# Patient Record
(demographics unavailable — no encounter records)

---

## 2025-02-06 NOTE — SOCIAL HISTORY
[House] : [unfilled] lives in a house  [Humidifier] : uses a humidifier [Bedroom] :  in bedroom [None] : none [Smokers in Household] : there are no smokers in the home [de-identified] : are rug in living room

## 2025-02-06 NOTE — END OF VISIT
[FreeTextEntry3] : BEAR Gunn has acted like a scribe on my behalf. I have reviewed the note and edited where appropriate. History, PE, assessment, and plan were personally performed by me.

## 2025-02-06 NOTE — SOCIAL HISTORY
[House] : [unfilled] lives in a house  [Humidifier] : uses a humidifier [Bedroom] :  in bedroom [None] : none [Smokers in Household] : there are no smokers in the home [de-identified] : are rug in living room

## 2025-02-06 NOTE — HISTORY OF PRESENT ILLNESS
[de-identified] : This is a 9 year old male presenting with mother for an initial consultation.  There is a history of nasal congestion all year round, worse in Spring and Fall. Symptoms also worse around cats and dogs. Goes through a box of tissue every 2-3 weeks. Takes zyrtec and uses flonase good relief of symptoms. Uses flonase intermittently throughout the year as well.  Eczema since infancy. Followed by derm. Flares on hands, feet and flexor suraces. Uses fragrance-free aveeno and bodywash. Uses topical steroids as needed.  Needed albuterol when patient was less than 1 years old, but since then has not needed albuterol. No wheezing. No food or medication allergies. No pets at home. Grandfather has dog, aunt has cat.

## 2025-02-06 NOTE — REASON FOR VISIT
[Initial Consultation] : an initial consultation for [Allergy Evaluation/ Skin Testing] : allergy evaluation and or skin testing [Congestion] : congestion [Runny Nose] : runny nose [Mother] : mother

## 2025-02-06 NOTE — HISTORY OF PRESENT ILLNESS
[de-identified] : This is a 9 year old male presenting with mother for an initial consultation.  There is a history of nasal congestion all year round, worse in Spring and Fall. Symptoms also worse around cats and dogs. Goes through a box of tissue every 2-3 weeks. Takes zyrtec and uses flonase good relief of symptoms. Uses flonase intermittently throughout the year as well.  Eczema since infancy. Followed by derm. Flares on hands, feet and flexor suraces. Uses fragrance-free aveeno and bodywash. Uses topical steroids as needed.  Needed albuterol when patient was less than 1 years old, but since then has not needed albuterol. No wheezing. No food or medication allergies. No pets at home. Grandfather has dog, aunt has cat.

## 2025-02-06 NOTE — IMPRESSION
[_____] : weeds ([unfilled]) [Allergy Testing Dust Mite] : dust mites [Allergy Testing Mixed Feathers] : feathers [Allergy Testing Cockroach] : cockroach [] : molds [Allergy Testing Grasses] : grasses